# Patient Record
Sex: MALE | Race: WHITE | NOT HISPANIC OR LATINO | Employment: FULL TIME | URBAN - METROPOLITAN AREA
[De-identification: names, ages, dates, MRNs, and addresses within clinical notes are randomized per-mention and may not be internally consistent; named-entity substitution may affect disease eponyms.]

---

## 2017-12-26 ENCOUNTER — HOSPITAL ENCOUNTER (OUTPATIENT)
Dept: RADIOLOGY | Facility: MEDICAL CENTER | Age: 32
End: 2017-12-26
Attending: NURSE PRACTITIONER
Payer: COMMERCIAL

## 2017-12-26 ENCOUNTER — OFFICE VISIT (OUTPATIENT)
Dept: URGENT CARE | Facility: PHYSICIAN GROUP | Age: 32
End: 2017-12-26
Payer: COMMERCIAL

## 2017-12-26 VITALS
SYSTOLIC BLOOD PRESSURE: 126 MMHG | WEIGHT: 220 LBS | HEIGHT: 71 IN | BODY MASS INDEX: 30.8 KG/M2 | HEART RATE: 75 BPM | TEMPERATURE: 99 F | DIASTOLIC BLOOD PRESSURE: 100 MMHG | OXYGEN SATURATION: 94 %

## 2017-12-26 DIAGNOSIS — S99.921A RIGHT FOOT INJURY, INITIAL ENCOUNTER: ICD-10-CM

## 2017-12-26 DIAGNOSIS — R22.0 SWELLING OF NOSE: ICD-10-CM
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
## 2017-12-26 DIAGNOSIS — S92.421A CLOSED DISPLACED FRACTURE OF DISTAL PHALANX OF RIGHT GREAT TOE, INITIAL ENCOUNTER: ICD-10-CM

## 2017-12-26 PROCEDURE — 73660 X-RAY EXAM OF TOE(S): CPT | Mod: RT

## 2017-12-26 PROCEDURE — 99203 OFFICE O/P NEW LOW 30 MIN: CPT | Mod: 25 | Performed by: NURSE PRACTITIONER

## 2017-12-26 PROCEDURE — 70150 X-RAY EXAM OF FACIAL BONES: CPT

## 2017-12-26 PROCEDURE — 12011 RPR F/E/E/N/L/M 2.5 CM/<: CPT | Performed by: NURSE PRACTITIONER

## 2017-12-26 ASSESSMENT — ENCOUNTER SYMPTOMS
TINGLING: 0
DOUBLE VISION: 0
BLURRED VISION: 0
EYE DISCHARGE: 0
DIZZINESS: 0
HEADACHES: 0
EYE PAIN: 0
BRUISES/BLEEDS EASILY: 0
SENSORY CHANGE: 0
PHOTOPHOBIA: 0
NECK PAIN: 0
FALLS: 1
WEAKNESS: 0
SINUS PAIN: 0
CHILLS: 0
EYE REDNESS: 0
MYALGIAS: 0

## 2017-12-26 NOTE — PROGRESS NOTES
"Subjective:      Geovanni Leslie is a 32 y.o. male who presents with Facial Injury (fell and hit face earlier today, minor lip laceration)            HPI  Slip and fall on ice today. Fell on face on grass and concrete. C/o nose pain and swelling. Laceration to lower lip. Right big toe swelling and pain. Lives in [place], [place], here visiting for holiday. No active bleeding. Denies HA, dizziness, n/v, neck pain.     PMH:  has no past medical history on file.  MEDS: No current outpatient prescriptions on file.  ALLERGIES: No Known Allergies  SURGHX: No past surgical history on file.  SOCHX:  reports that he has never smoked. He has never used smokeless tobacco.  FH: Family history was reviewed, no pertinent findings to report    Review of Systems   Constitutional: Negative for chills and malaise/fatigue.   HENT: Negative for congestion and sinus pain.    Eyes: Negative for blurred vision, double vision, photophobia, pain, discharge and redness.   Musculoskeletal: Positive for falls. Negative for myalgias and neck pain.   Neurological: Negative for dizziness, tingling, sensory change, weakness and headaches.   Endo/Heme/Allergies: Does not bruise/bleed easily.   All other systems reviewed and are negative.         Objective:     /100   Pulse 75   Temp 37.2 °C (99 °F)   Ht 1.803 m (5' 11\")   Wt 99.8 kg (220 lb)   SpO2 94%   BMI 30.68 kg/m²      Physical Exam   Constitutional: Vital signs are normal. He appears well-developed and well-nourished. He is active and cooperative.  Non-toxic appearance. He does not have a sickly appearance. He does not appear ill. No distress.   HENT:   Head: Normocephalic.   Nose: Sinus tenderness present. No mucosal edema, rhinorrhea, nose lacerations, nasal deformity, septal deviation or nasal septal hematoma. No epistaxis.   Mouth/Throat: Uvula is midline, oropharynx is clear and moist and mucous membranes are normal. Lacerations present. No uvula swelling.       2 punctures in " lower inner lip with swelling and bruising, no active bleeding. 1.5 cm laceration just below lower outer lip line, swelling, bruising, no active bleeding. Procedure: Laceration Repair  -Risks including bleeding, nerve damage, infection, and poor cosmetic outcome discussed at length. Benefits and alternatives discussed.   -Sterile technique throughout  -Local anesthesia with 1% lidocaine  -Closed with #5,  6-0 Vicryl interrupted sutures with good wound approximation (#2 inner lip, #3 outer lip)  -Polysporin and dressing placed  -Patient tolerated well       Eyes: Conjunctivae and EOM are normal. Pupils are equal, round, and reactive to light.   Neck: Normal range of motion. Neck supple.   Cardiovascular: Normal rate.    Pulmonary/Chest: Effort normal.   Musculoskeletal: Normal range of motion.   Neurological: He is alert. He has normal strength. No cranial nerve deficit or sensory deficit. Coordination and gait normal. GCS eye subscore is 4. GCS verbal subscore is 5. GCS motor subscore is 6.   Skin: Skin is warm and dry. Bruising and laceration noted. He is not diaphoretic.   Vitals reviewed.         Facial xray FINDINGS:  Visualized paranasal sinuses are clear.  No air-fluid levels.  Orbits are grossly intact.  Mandible is intact.  Zygomatic arches appear intact.  No displaced fracture of the nasal bones demonstrated.  Inferior nasal spine is intact     Toe xray:Minimally displaced fracture at the base of RIGHT 1st distal phalanx, possibly involving articular surface.    VERNELL lieberman walking boot  Assessment/Plan:     1. Fall due to slipping on ice or snow, initial encounter    - DX-TOE(S) 2+ RIGHT; Future    2. Right foot injury, initial encounter    - DX-TOE(S) 2+ RIGHT; Future    3. Swelling of nose    - DX-FACIAL BONES-COMPLETE 3+; Future    4. Closed displaced fracture of distal phalanx of right great toe, initial encounter    May use Ibuprofen prn for pain/swelling  May use cool compresses for swelling  prn  Perform proper body mechanics with lifting, twisting, bending and walking. Ask for assistance with heay objects prn  Monitor for deformity, numbness/tingling in toes, decreased ROM with walking difficulty- recheck  Follow up with ortho once back in [place]      Wash mouth gently, do not scrub, pat dry  May gently gargle with water to rinse any blood from mouth  May use TAB ointment to outer suture line  May use ice for swelling or discomfort  Eat soft foods or smoothies to prevent suture line interruption  Monitor for increased swelling, redness and increased heat to site, drainage- need re-evaluation  Suture removal in 7-10 days